# Patient Record
Sex: FEMALE | Race: WHITE | Employment: OTHER | URBAN - METROPOLITAN AREA
[De-identification: names, ages, dates, MRNs, and addresses within clinical notes are randomized per-mention and may not be internally consistent; named-entity substitution may affect disease eponyms.]

---

## 2020-05-19 ENCOUNTER — APPOINTMENT (OUTPATIENT)
Dept: GENERAL RADIOLOGY | Age: 70
End: 2020-05-19
Attending: FAMILY MEDICINE

## 2020-05-19 ENCOUNTER — HOSPITAL ENCOUNTER (OUTPATIENT)
Age: 70
Discharge: HOME | End: 2020-05-19
Attending: FAMILY MEDICINE

## 2020-05-19 VITALS
DIASTOLIC BLOOD PRESSURE: 105 MMHG | HEIGHT: 64.96 IN | SYSTOLIC BLOOD PRESSURE: 157 MMHG | RESPIRATION RATE: 18 BRPM | WEIGHT: 180 LBS | BODY MASS INDEX: 29.99 KG/M2 | OXYGEN SATURATION: 96 % | HEART RATE: 87 BPM | TEMPERATURE: 99 F

## 2020-05-19 DIAGNOSIS — R07.9 CHEST PAIN, RULE OUT ACUTE MYOCARDIAL INFARCTION: Primary | ICD-10-CM

## 2020-05-19 PROCEDURE — 93010 ELECTROCARDIOGRAM REPORT: CPT

## 2020-05-19 PROCEDURE — 84484 ASSAY OF TROPONIN QUANT: CPT

## 2020-05-19 PROCEDURE — 36415 COLL VENOUS BLD VENIPUNCTURE: CPT

## 2020-05-19 PROCEDURE — 99205 OFFICE O/P NEW HI 60 MIN: CPT

## 2020-05-19 PROCEDURE — 93005 ELECTROCARDIOGRAM TRACING: CPT

## 2020-05-19 PROCEDURE — 80047 BASIC METABLC PNL IONIZED CA: CPT

## 2020-05-19 PROCEDURE — 71046 X-RAY EXAM CHEST 2 VIEWS: CPT | Performed by: FAMILY MEDICINE

## 2020-05-19 PROCEDURE — 85378 FIBRIN DEGRADE SEMIQUANT: CPT | Performed by: FAMILY MEDICINE

## 2020-05-19 PROCEDURE — 85025 COMPLETE CBC W/AUTO DIFF WBC: CPT | Performed by: FAMILY MEDICINE

## 2020-05-19 NOTE — ED INITIAL ASSESSMENT (HPI)
Patient states left sided constant crushing chest pain yesterday and then resolved.   Has SOB yesterday    Today- at breakfast developed to the left side of the chest and the left arm  Had SOB  Had air travel on March 12th from [facility] to [facility]

## 2020-05-20 NOTE — ED PROVIDER NOTES
Patient Seen in: [facility] [place]      History   Patient presents with:  Chest Pain    Stated Complaint: chest pain, upper back, left arm pain x 2 days     HPI    26-year-old female with history of hypertension presents left-sided lubna Physical Exam  Constitutional:       Appearance: She is well-developed. HENT:      Head: Normocephalic and atraumatic.       Right Ear: External ear normal.      Left Ear: External ear normal.      Nose: Nose normal.   Eyes:      Conjunctiva/scler [place] [phone]  Today  For re-check          Medications Prescribed:  Discharge Medication List as of 5/19/2020  7:19 PM

## 2020-06-25 PROBLEM — Z13.6 SCREENING FOR CARDIOVASCULAR CONDITION: Status: ACTIVE | Noted: 2020-06-25

## 2020-06-25 PROBLEM — I10 ESSENTIAL HYPERTENSION: Status: ACTIVE | Noted: 2020-06-25

## (undated) NOTE — LETTER
Date & Time: 5/19/2020, 7:19 PM  Patient: Mikaela Higuera  Encounter Provider(s):    Shefali Thakur MD         This certifies that I, Mikaela Higuera, a patient at an [facility] facility, am leaving the facility voluntarily an